# Patient Record
Sex: FEMALE | Race: WHITE | NOT HISPANIC OR LATINO | URBAN - METROPOLITAN AREA
[De-identification: names, ages, dates, MRNs, and addresses within clinical notes are randomized per-mention and may not be internally consistent; named-entity substitution may affect disease eponyms.]

---

## 2017-07-26 ENCOUNTER — APPOINTMENT (RX ONLY)
Dept: URBAN - METROPOLITAN AREA OTHER 10 | Facility: OTHER | Age: 67
Setting detail: DERMATOLOGY
End: 2017-07-26

## 2017-07-26 DIAGNOSIS — L82.1 OTHER SEBORRHEIC KERATOSIS: ICD-10-CM

## 2017-07-26 PROBLEM — I10 ESSENTIAL (PRIMARY) HYPERTENSION: Status: ACTIVE | Noted: 2017-07-26

## 2017-07-26 PROBLEM — E78.5 HYPERLIPIDEMIA, UNSPECIFIED: Status: ACTIVE | Noted: 2017-07-26

## 2017-07-26 PROCEDURE — ? COUNSELING

## 2017-07-26 PROCEDURE — 99202 OFFICE O/P NEW SF 15 MIN: CPT

## 2017-07-26 ASSESSMENT — LOCATION SIMPLE DESCRIPTION DERM: LOCATION SIMPLE: RIGHT EAR

## 2017-07-26 ASSESSMENT — LOCATION DETAILED DESCRIPTION DERM: LOCATION DETAILED: RIGHT SUPERIOR POSTERIOR HELIX

## 2017-07-26 ASSESSMENT — LOCATION ZONE DERM: LOCATION ZONE: EAR

## 2017-07-26 NOTE — HPI: SKIN LESION
How Severe Is Your Skin Lesion?: mild
Has Your Skin Lesion Been Treated?: not been treated
Is This A New Presentation, Or A Follow-Up?: Growth
Additional History: New patient here for focused visit.

## 2023-08-17 ENCOUNTER — OFFICE VISIT (OUTPATIENT)
Dept: URBAN - METROPOLITAN AREA CLINIC 74 | Facility: CLINIC | Age: 73
End: 2023-08-17
Payer: MEDICARE

## 2023-08-17 ENCOUNTER — LAB OUTSIDE AN ENCOUNTER (OUTPATIENT)
Dept: URBAN - METROPOLITAN AREA CLINIC 74 | Facility: CLINIC | Age: 73
End: 2023-08-17

## 2023-08-17 VITALS
OXYGEN SATURATION: 98 % | SYSTOLIC BLOOD PRESSURE: 124 MMHG | WEIGHT: 168 LBS | BODY MASS INDEX: 30.91 KG/M2 | HEIGHT: 62 IN | TEMPERATURE: 97.2 F | HEART RATE: 62 BPM | DIASTOLIC BLOOD PRESSURE: 80 MMHG

## 2023-08-17 DIAGNOSIS — E66.9 OBESITY (BMI 30-39.9): ICD-10-CM

## 2023-08-17 DIAGNOSIS — R10.32 ABDOMINAL DISCOMFORT IN LEFT LOWER QUADRANT: ICD-10-CM

## 2023-08-17 PROBLEM — 162864005: Status: ACTIVE | Noted: 2023-08-17

## 2023-08-17 PROBLEM — 429969003: Status: ACTIVE | Noted: 2023-08-17

## 2023-08-17 PROBLEM — 301716002: Status: ACTIVE | Noted: 2023-08-17

## 2023-08-17 PROBLEM — 305058001: Status: ACTIVE | Noted: 2023-08-17

## 2023-08-17 PROBLEM — 82423001: Status: ACTIVE | Noted: 2023-08-17

## 2023-08-17 PROCEDURE — 99203 OFFICE O/P NEW LOW 30 MIN: CPT | Performed by: INTERNAL MEDICINE

## 2023-08-17 PROCEDURE — 99243 OFF/OP CNSLTJ NEW/EST LOW 30: CPT | Performed by: INTERNAL MEDICINE

## 2023-08-17 RX ORDER — DICYCLOMINE HYDROCHLORIDE 20 MG/1
1 TABLET TABLET ORAL THREE TIMES A DAY
Status: ACTIVE | COMMUNITY

## 2023-08-17 RX ORDER — PREGABALIN 150 MG/1
1 CAPSULE CAPSULE ORAL ONCE A DAY
Status: ACTIVE | COMMUNITY

## 2023-08-17 RX ORDER — ATORVASTATIN CALCIUM 40 MG/1
1 TABLET TABLET, FILM COATED ORAL ONCE A DAY
Status: ACTIVE | COMMUNITY

## 2023-08-17 RX ORDER — ADHESIVE TAPE 3"X 2.3 YD
AS DIRECTED TAPE, NON-MEDICATED TOPICAL
Status: ACTIVE | COMMUNITY

## 2023-08-17 RX ORDER — SODIUM, POTASSIUM,MAG SULFATES 17.5-3.13G
177ML SOLUTION, RECONSTITUTED, ORAL ORAL
Qty: 1 | OUTPATIENT
Start: 2023-08-17 | End: 2023-08-19

## 2023-08-17 RX ORDER — LISINOPRIL AND HYDROCHLOROTHIAZIDE 20; 12.5 MG/1; MG/1
1 TABLET TABLET ORAL ONCE A DAY
Status: ACTIVE | COMMUNITY

## 2023-08-17 NOTE — HPI-TODAY'S VISIT:
The pain patient is a 73-year-old  female patient of Dr. Briseida Flynn who is referred for consultation.  A copy of these document is being forwarded to the referring physician.  The patient presents to the office stating that she is in the need of having a screening colonoscopy, the previous colonoscopy was performed over 20 years ago and was reported normal.  The patient states that her mother did have colonic polyps.  Currently the patient denies having any diarrhea, constipation, rectal bleeding or hematochezia there has been no melena.  The patient claims that for a period of a couple weeks she had intermittent cramping discomfort in the left lower quadrant, it lasted a couple seconds at a time it did not radiate, it was not associated with food ingestion, defecation or urination, no associated with changing position or physical activity..  The patient's discomfort has stopped.  The patient denies having any upper GI symptoms such as dysphagia, odynophagia, nausea, vomiting, heartburn.  There has been no unexplained weight loss.  The patient has chronic back pain and currently is having mostly problems with the SI joint.  The patient had a previous lower back fusion.  The patient has been recommended to have a colonoscopy, benefits potential complications and alternatives to colonoscopy have been disclosed.  The patient will return for a follow-up visit after she completes her evaluation.

## 2023-08-29 ENCOUNTER — OFFICE VISIT (OUTPATIENT)
Dept: URBAN - METROPOLITAN AREA SURGERY CENTER 30 | Facility: SURGERY CENTER | Age: 73
End: 2023-08-29

## 2023-09-12 ENCOUNTER — OFFICE VISIT (OUTPATIENT)
Dept: URBAN - METROPOLITAN AREA SURGERY CENTER 30 | Facility: SURGERY CENTER | Age: 73
End: 2023-09-12

## 2023-09-14 ENCOUNTER — WEB ENCOUNTER (OUTPATIENT)
Dept: URBAN - METROPOLITAN AREA SURGERY CENTER 30 | Facility: SURGERY CENTER | Age: 73
End: 2023-09-14

## 2023-09-18 ENCOUNTER — OFFICE VISIT (OUTPATIENT)
Dept: URBAN - METROPOLITAN AREA SURGERY CENTER 30 | Facility: SURGERY CENTER | Age: 73
End: 2023-09-18
Payer: MEDICARE

## 2023-09-18 ENCOUNTER — CLAIMS CREATED FROM THE CLAIM WINDOW (OUTPATIENT)
Dept: URBAN - METROPOLITAN AREA CLINIC 4 | Facility: CLINIC | Age: 73
End: 2023-09-18
Payer: MEDICARE

## 2023-09-18 DIAGNOSIS — D12.2 ADENOMA OF ASCENDING COLON: ICD-10-CM

## 2023-09-18 DIAGNOSIS — Z12.11 SCREENING FOR COLON CANCER: ICD-10-CM

## 2023-09-18 DIAGNOSIS — K63.89 OTHER SPECIFIED DISEASES OF INTESTINE: ICD-10-CM

## 2023-09-18 DIAGNOSIS — R10.32 ABDOMINAL CRAMPING IN LEFT LOWER QUADRANT: ICD-10-CM

## 2023-09-18 DIAGNOSIS — K62.1 RECTAL POLYP: ICD-10-CM

## 2023-09-18 DIAGNOSIS — D12.3 ADENOMA OF TRANSVERSE COLON: ICD-10-CM

## 2023-09-18 DIAGNOSIS — K62.1 ANAL AND RECTAL POLYP: ICD-10-CM

## 2023-09-18 DIAGNOSIS — K57.30 DIVERTICULA OF COLON: ICD-10-CM

## 2023-09-18 DIAGNOSIS — Z83.71 FAMILY HISTORY OF COLON POLYPS: ICD-10-CM

## 2023-09-18 DIAGNOSIS — K63.5 COLON POLYP: ICD-10-CM

## 2023-09-18 PROCEDURE — 45385 COLONOSCOPY W/LESION REMOVAL: CPT | Performed by: INTERNAL MEDICINE

## 2023-09-18 PROCEDURE — G8907 PT DOC NO EVENTS ON DISCHARG: HCPCS | Performed by: INTERNAL MEDICINE

## 2023-09-18 PROCEDURE — 88305 TISSUE EXAM BY PATHOLOGIST: CPT | Performed by: PATHOLOGY

## 2023-09-18 PROCEDURE — 45380 COLONOSCOPY AND BIOPSY: CPT | Performed by: INTERNAL MEDICINE

## 2023-09-18 PROCEDURE — 00811 ANES LWR INTST NDSC NOS: CPT | Performed by: NURSE ANESTHETIST, CERTIFIED REGISTERED

## 2023-11-02 PROBLEM — 309084001: Status: ACTIVE | Noted: 2023-11-02

## 2023-11-02 PROBLEM — 721691004: Status: ACTIVE | Noted: 2023-11-02

## 2023-11-02 PROBLEM — 428054006: Status: ACTIVE | Noted: 2023-11-02

## 2023-11-02 PROBLEM — 278860009: Status: ACTIVE | Noted: 2023-11-02

## 2023-11-03 ENCOUNTER — DASHBOARD ENCOUNTERS (OUTPATIENT)
Age: 73
End: 2023-11-03

## 2023-11-08 ENCOUNTER — CLAIMS CREATED FROM THE CLAIM WINDOW (OUTPATIENT)
Dept: URBAN - METROPOLITAN AREA CLINIC 74 | Facility: CLINIC | Age: 73
End: 2023-11-08
Payer: MEDICARE

## 2023-11-08 VITALS
OXYGEN SATURATION: 98 % | BODY MASS INDEX: 30.36 KG/M2 | SYSTOLIC BLOOD PRESSURE: 126 MMHG | DIASTOLIC BLOOD PRESSURE: 70 MMHG | WEIGHT: 165 LBS | TEMPERATURE: 98.2 F | HEIGHT: 62 IN | HEART RATE: 71 BPM

## 2023-11-08 DIAGNOSIS — D12.3 ADENOMATOUS POLYP OF TRANSVERSE COLON: ICD-10-CM

## 2023-11-08 DIAGNOSIS — M54.50 LOW BACK PAIN, UNSPECIFIED: ICD-10-CM

## 2023-11-08 DIAGNOSIS — D37.4 VILLOUS ADENOMA OF COLON: ICD-10-CM

## 2023-11-08 DIAGNOSIS — K62.1 HYPERPLASTIC RECTAL POLYP: ICD-10-CM

## 2023-11-08 DIAGNOSIS — Z83.71 FAMILY HISTORY OF COLONIC POLYPS: ICD-10-CM

## 2023-11-08 DIAGNOSIS — E66.9 OBESITY (BMI 30-39.9): ICD-10-CM

## 2023-11-08 DIAGNOSIS — Z83.719 FAMILY HISTORY OF COLONIC POLYPS: ICD-10-CM

## 2023-11-08 PROCEDURE — 99213 OFFICE O/P EST LOW 20 MIN: CPT | Performed by: INTERNAL MEDICINE

## 2023-11-08 RX ORDER — ATORVASTATIN CALCIUM 40 MG/1
1 TABLET TABLET, FILM COATED ORAL ONCE A DAY
Status: ACTIVE | COMMUNITY

## 2023-11-08 RX ORDER — ADHESIVE TAPE 3"X 2.3 YD
AS DIRECTED TAPE, NON-MEDICATED TOPICAL
Status: ACTIVE | COMMUNITY

## 2023-11-08 RX ORDER — PREGABALIN 150 MG/1
1 CAPSULE CAPSULE ORAL ONCE A DAY
Status: ACTIVE | COMMUNITY

## 2023-11-08 RX ORDER — LISINOPRIL AND HYDROCHLOROTHIAZIDE 20; 12.5 MG/1; MG/1
1 TABLET TABLET ORAL ONCE A DAY
Status: ACTIVE | COMMUNITY

## 2023-11-08 NOTE — HPI-TODAY'S VISIT:
The pain patient is a 73-year-old  female patient of Dr. Briseida Flynn who is referred for consultation.  A copy of these document is being forwarded to the referring physician.  The patient presents to the office stating that she is in the need of having a screening colonoscopy, the previous colonoscopy was performed over 20 years ago and was reported normal.  The patient states that her mother did have colonic polyps.  Currently the patient denies having any diarrhea, constipation, rectal bleeding or hematochezia there has been no melena.  The patient claims that for a period of a couple weeks she had intermittent cramping discomfort in the left lower quadrant, it lasted a couple seconds at a time it did not radiate, it was not associated with food ingestion, defecation or urination, no associated with changing position or physical activity..  The patient's discomfort has stopped.  The patient denies having any upper GI symptoms such as dysphagia, odynophagia, nausea, vomiting, heartburn.  There has been no unexplained weight loss.  The patient has chronic back pain and currently is having mostly problems with the SI joint.  The patient had a previous lower back fusion.  The patient has been recommended to have a colonoscopy, benefits potential complications and alternatives to colonoscopy have been disclosed.   Today November 8, 2023 the patient returns for a follow-up visit, the patient was last seen on August 17, 2023 with family history of colon polyps, left lower quadrant abdominal discomfort, low back pain and obesity.    At the time the patient stated that she did have a colonoscopy about 20 years before the visit and was normal.  The patient's mother did have colonic polyps, the patient denied having any symptoms such as diarrhea, constipation, rectal bleeding or hematochezia.   For a short period of time she did have cramping discomfort in the left lower quadrant lasting seconds at a time with no radiation, by the time she was seen in the office in the discomfort had stopped.  The patient denies having any upper GI symptoms such as dysphagia, odynophagia, nausea vomiting there was no heartburn.  The patient did suffer of chronic low back pain.  The patient was scheduled to have a colonoscopy for screening purposes.The colonoscopy was performed on September 18, 2023.  At the time of the colonoscopy we removed a tubulovillous adenoma from the ascending colon measuring 12 mm, it was multilobulated and removed with a hot snare, 2 other sessile polyps were removed from the ascending colon also villous adenomas measuring 4 and 5 mm, there were removed with a cold snare, the patient had a 5 mm transverse sessile polyp which was an adenomatous polyp removed with a cold snare and a 2 mm hyperplastic sessile rectal polyp removed with cold biopsy forceps the patient will be advised to have follow-up colonoscopy in 3 years.  Today the patient returns to the office stating that she is doing well, currently she is having type IV stools on the Natoma scale, no rectal bleeding or hematochezia, no abdominal pain.  The patient denies having any upper GI symptoms such as dysphagia, odynophagia, nausea or vomiting.I discussed with the patient the recent colonoscopy performed September 18, 2023, she was made aware that we removed a tubulovillous adenoma from the ascending colon measuring 12 mm, we also removed to order a villous adenomatous from the ascending colon measuring 4 and 5 mm, a 5 mm adenomatous transverse colon polyp and a 2 mm hyperplastic rectal polyp. The patient was made aware of potential red flags such as major changes in bowel habit, persistent diarrhea or constipation, persistent rectal bleeding or hematochezia, unexplained weight loss, unexplained abdominal pain. The patient agreed to return for a follow-up colonoscopy in 3 years. The patient's family history significant for colonic polyps on her mother, there is no family history of colon cancer.